# Patient Record
Sex: MALE
[De-identification: names, ages, dates, MRNs, and addresses within clinical notes are randomized per-mention and may not be internally consistent; named-entity substitution may affect disease eponyms.]

---

## 2022-11-28 ENCOUNTER — DASHBOARD ENCOUNTERS (OUTPATIENT)
Age: 62
End: 2022-11-28

## 2022-11-28 ENCOUNTER — OFFICE VISIT (OUTPATIENT)
Dept: URBAN - METROPOLITAN AREA CLINIC 78 | Facility: CLINIC | Age: 62
End: 2022-11-28
Payer: COMMERCIAL

## 2022-11-28 VITALS
RESPIRATION RATE: 16 BRPM | HEIGHT: 67 IN | TEMPERATURE: 98.1 F | WEIGHT: 159.8 LBS | HEART RATE: 80 BPM | SYSTOLIC BLOOD PRESSURE: 152 MMHG | DIASTOLIC BLOOD PRESSURE: 90 MMHG | BODY MASS INDEX: 25.08 KG/M2

## 2022-11-28 DIAGNOSIS — K59.04 CHRONIC IDIOPATHIC CONSTIPATION: ICD-10-CM

## 2022-11-28 DIAGNOSIS — Z12.11 COLON CANCER SCREENING: ICD-10-CM

## 2022-11-28 PROBLEM — 82934008: Status: ACTIVE | Noted: 2022-11-28

## 2022-11-28 PROCEDURE — 99203 OFFICE O/P NEW LOW 30 MIN: CPT | Performed by: INTERNAL MEDICINE

## 2022-11-28 RX ORDER — LISINOPRIL 10 MG/1
1 TABLET TABLET ORAL ONCE A DAY
Status: ACTIVE | COMMUNITY

## 2022-11-28 RX ORDER — SODIUM, POTASSIUM,MAG SULFATES 17.5-3.13G
177 ML DAY 1 AND 177 ML DAY 2 SOLUTION, RECONSTITUTED, ORAL ORAL
Qty: 354 MILLILITER | OUTPATIENT

## 2022-11-28 RX ORDER — LEVOTHYROXINE SODIUM 125 UG/1
1 TABLET IN THE MORNING ON AN EMPTY STOMACH TABLET ORAL ONCE A DAY
Status: ACTIVE | COMMUNITY

## 2022-11-28 NOTE — HPI-TODAY'S VISIT:
Patient was referred by Dr. Sima Sagastume A copy of this document will be sent to the physician.   The patient presents for a colon cancer screening. There is no family history of colon polyps or cancer. Patient denies change in bowel habits, appetite, and weight. Patient denies bleeding per rectum. Last colonoscopy: Never   Constipation : patient reports constipation as no BM or straining during defecation   Last labs : June 2022 reportedly normal     Denies chest pain  Denies SOB  Denies easy bruising  Denies anesthesia complication

## 2022-11-29 PROBLEM — 47717004 ABNORMAL DEGLUTITION: Status: ACTIVE | Noted: 2022-11-29

## 2022-12-15 ENCOUNTER — TELEPHONE ENCOUNTER (OUTPATIENT)
Dept: URBAN - METROPOLITAN AREA CLINIC 78 | Facility: CLINIC | Age: 62
End: 2022-12-15

## 2022-12-23 ENCOUNTER — OFFICE VISIT (OUTPATIENT)
Dept: URBAN - METROPOLITAN AREA MEDICAL CENTER 27 | Facility: MEDICAL CENTER | Age: 62
End: 2022-12-23
Payer: COMMERCIAL

## 2022-12-23 DIAGNOSIS — K63.5 BENIGN COLON POLYP: ICD-10-CM

## 2022-12-23 DIAGNOSIS — Z12.11 COLON CANCER SCREENING: ICD-10-CM

## 2022-12-23 PROCEDURE — 45385 COLONOSCOPY W/LESION REMOVAL: CPT | Performed by: INTERNAL MEDICINE

## 2022-12-23 RX ORDER — SODIUM, POTASSIUM,MAG SULFATES 17.5-3.13G
177 ML DAY 1 AND 177 ML DAY 2 SOLUTION, RECONSTITUTED, ORAL ORAL
Qty: 354 MILLILITER | Status: ACTIVE | COMMUNITY

## 2022-12-23 RX ORDER — LISINOPRIL 10 MG/1
1 TABLET TABLET ORAL ONCE A DAY
Status: ACTIVE | COMMUNITY

## 2022-12-23 RX ORDER — LEVOTHYROXINE SODIUM 125 UG/1
1 TABLET IN THE MORNING ON AN EMPTY STOMACH TABLET ORAL ONCE A DAY
Status: ACTIVE | COMMUNITY

## 2022-12-30 ENCOUNTER — OFFICE VISIT (OUTPATIENT)
Dept: URBAN - METROPOLITAN AREA SURGERY CENTER 15 | Facility: SURGERY CENTER | Age: 62
End: 2022-12-30